# Patient Record
Sex: FEMALE | Race: WHITE | NOT HISPANIC OR LATINO
[De-identification: names, ages, dates, MRNs, and addresses within clinical notes are randomized per-mention and may not be internally consistent; named-entity substitution may affect disease eponyms.]

---

## 2022-06-28 PROBLEM — Z00.00 ENCOUNTER FOR PREVENTIVE HEALTH EXAMINATION: Status: ACTIVE | Noted: 2022-06-28

## 2022-06-30 ENCOUNTER — APPOINTMENT (OUTPATIENT)
Dept: ORTHOPEDIC SURGERY | Facility: CLINIC | Age: 62
End: 2022-06-30

## 2022-06-30 PROCEDURE — 99204 OFFICE O/P NEW MOD 45 MIN: CPT

## 2022-06-30 PROCEDURE — 73560 X-RAY EXAM OF KNEE 1 OR 2: CPT | Mod: RT

## 2022-07-05 DIAGNOSIS — Z87.39 PERSONAL HISTORY OF OTHER DISEASES OF THE MUSCULOSKELETAL SYSTEM AND CONNECTIVE TISSUE: ICD-10-CM

## 2022-07-05 DIAGNOSIS — Z86.79 PERSONAL HISTORY OF OTHER DISEASES OF THE CIRCULATORY SYSTEM: ICD-10-CM

## 2022-07-05 DIAGNOSIS — Z78.9 OTHER SPECIFIED HEALTH STATUS: ICD-10-CM

## 2022-07-06 NOTE — ADDENDUM
[FreeTextEntry1] : This note was written by Daja Lopez on 07/06/2022 acting as a scribe for KYLE SANDY III, MD

## 2022-07-06 NOTE — HISTORY OF PRESENT ILLNESS
[de-identified] : The patient comes in today with complaints to her right knee.  She has been seen and treated elsewhere with physical therapy and cortisone injections, but is having persistent complaints of pain. She states it has been going on for six years. This injury is not work related or due to an automobile accident.  The patient states the pain is constant.  The patient describes the pain as achy and cramping.  The patient notes ice makes her symptoms better, while walking and bending makes her symptoms worse.  The patient indicates a pain level of 6 on a pain scale of 0-10.  [] : No [3] : the relief from medicine is 3/10 [9] : the ailment interference is 9/10 [7] : the ailment interference is 7/10 [8] : the ailment interference is 8/10 [de-identified] : Meloxicam

## 2022-07-06 NOTE — DISCUSSION/SUMMARY
[de-identified] : At this time, due to osteoarthritis of the right knee, we had a long discussion and since she has failed her other conservative modalities, I recommend she undergo a course of viscosupplementation.

## 2022-07-06 NOTE — PHYSICAL EXAM
[de-identified] : Left Knee: \par Range of Motion in Degrees	\par 	                  Claimant:	Normal:	\par Flexion Active	  135 	                135-degrees	\par Flexion Passive	  135	                135-degrees	\par Extension Active	  0-5	                0-5-degrees	\par Extension Passive	  0-5	                0-5-degrees	\par \par No weakness to flexion/extension.  No evidence of instability in the AP plane or varus or valgus stress.  Negative  Lachman.  Negative pivot shift.  Negative anterior drawer test.  Negative posterior drawer test.  Negative Chadd.  Negative Apley grind.  No medial or lateral joint line tenderness.  No tenderness over the medial and lateral facet of the patella.  No patellofemoral crepitations.  No lateral tilting patella.  No patellar apprehension.  No crepitation in the medial and lateral femoral condyle.  No proximal or distal swelling, edema or tenderness.  No gross motor or sensory deficits.  No intra-articular swelling.  2+ DP and PT pulses. No varus or valgus malalignment.  Skin is intact.  No rashes, scars or lesions.  \par \par Right Knee: \par Range of Motion in Degrees	\par 	                  Claimant:	Normal:	\par Flexion Active	  120 	                135-degrees	\par Flexion Passive	  120	                135-degrees	\par Extension Active	   10	                0-5-degrees	\par Extension Passive	   10	                0-5-degrees	\par \par No weakness to flexion/extension.  No evidence of instability in the AP plane or varus or valgus stress.  Negative  Lachman.  Negative pivot shift.  Negative anterior drawer test.  Negative posterior drawer test.  Negative Chadd.  Negative Apley grind.  No medial or lateral joint line tenderness.  Positive tenderness over the medial and lateral facet of the patella.  Positive patellofemoral crepitations.  No lateral tilting patella.  No patella apprehension.  Positive crepitation in the medial and lateral femoral condyle.  No proximal or distal swelling, edema or tenderness.  No gross motor or sensory deficits.  Mild intra-articular swelling.  2+ DP and PT pulses.  Mild varus deformity.  Skin is intact.  No rashes, scars or lesions.   \par   [de-identified] : Ambulating with a slightly antalgic to antalgic gait.  Station:  Normal.  [de-identified] : Appearance:  Well-developed, well-nourished female in no acute distress.\par   [de-identified] : Radiographs, which were taken in the office, two views of the right knee, show moderate to severe arthritis.

## 2022-07-06 NOTE — CONSULT LETTER
[Dear  ___] : Dear  [unfilled], [FreeTextEntry1] : \par I had the pleasure of evaluating your patient, Flory Davalos.\par \par Thank you very much for allowing me to participate in the care of this patient. \par Please see my note below.\par \par If you have any questions, please do not hesitate to contact me.\par \par Sincerely,\par \par \par Morales Maki III, MD\par ERIS/sg

## 2022-07-07 ENCOUNTER — APPOINTMENT (OUTPATIENT)
Dept: ORTHOPEDIC SURGERY | Facility: CLINIC | Age: 62
End: 2022-07-07

## 2022-07-07 PROCEDURE — 20610 DRAIN/INJ JOINT/BURSA W/O US: CPT | Mod: RT

## 2022-07-07 PROCEDURE — 99214 OFFICE O/P EST MOD 30 MIN: CPT | Mod: 25

## 2022-07-07 RX ORDER — HYALURONATE SODIUM 30 MG/2 ML
30 SYRINGE (ML) INTRAARTICULAR
Qty: 4 | Refills: 0 | Status: ACTIVE | OUTPATIENT
Start: 2022-07-07

## 2022-07-13 NOTE — ADDENDUM
[FreeTextEntry1] : This note was written by Cara Ingram on 07/12/2022 acting as scribe for Morales Maki III, MD

## 2022-07-13 NOTE — DISCUSSION/SUMMARY
[de-identified] : At this time, due to osteoarthritis of the right knee and because of the delay in getting approval for viscosupplementation, the patient was given a cortisone injection (as noted above).  I recommend ice and elevation.   As far as the left knee osteoarthritis, I recommend home physical therapy. She will be reassessed in two to three weeks.\par \par

## 2022-07-13 NOTE — REASON FOR VISIT
[Follow-Up Visit] : a follow-up visit for [FreeTextEntry2] : her right knee and new concern for her left knee.

## 2022-07-13 NOTE — PHYSICAL EXAM
[de-identified] : Right Knee: \par Range of Motion in Degrees	\par 	  Claimant:	Normal:	\par Flexion Active	 120 	 135-degrees	\par Flexion Passive	 120	 135-degrees	\par Extension Active	 10	 0-5-degrees	\par Extension Passive	 10	 0-5-degrees	\par \par No weakness to flexion/extension. No evidence of instability in the AP plane or varus or valgus stress. Negative Lachman. Negative pivot shift. Negative anterior drawer test. Negative posterior drawer test. Negative Chadd. Negative Apley grind. No medial or lateral joint line tenderness. Positive tenderness over the medial and lateral facet of the patella. Positive patellofemoral crepitations. No lateral tilting patella. No patella apprehension. Positive crepitation in the medial and lateral femoral condyle. No proximal or distal swelling, edema or tenderness. No gross motor or sensory deficits. Mild intra-articular swelling. 2+ DP and PT pulses. Mild varus deformity. Skin is intact. No rashes, scars or lesions. \par \par Left Knee:\par Range of Motion in Degrees	\par 	                  Claimant:	Normal:	\par Flexion Active	  135 	                135-degrees	\par Flexion Passive	  135	                135-degrees	\par Extension Active	  0-5	                0-5-degrees	\par Extension Passive	  0-5	                0-5-degrees	\par \par No weakness to flexion/extension.  No evidence of instability in the AP plane or varus or valgus stress.  Negative  Lachman.  Negative pivot shift.  Negative anterior drawer test.  Negative posterior drawer test.  Negative Chadd.  Negative Apley grind.  No medial or lateral joint line tenderness.  Positive tenderness over the medial and lateral facet of the patella.  Positive patellofemoral crepitations.  No lateral tilting patella.  No patella apprehension.  Positive crepitation in the medial and lateral femoral condyle.  No proximal or distal swelling, edema or tenderness.  No gross motor or sensory deficits.  Mild intra-articular swelling.  2+ DP and PT pulses.  No varus or valgus malalignment.  Skin is intact.  No rashes, scars or lesions. \par  [de-identified] : Gait:  Ambulating with a slightly antalgic to antalgic gait.  Station:  Normal.  [de-identified] : Appearance:  Well-developed, well-nourished female in no acute distress.\par   [de-identified] : Radiographs, one to two views of the left knee taken in the office today, shows severe osteoarthritis.

## 2022-07-13 NOTE — PROCEDURE
[de-identified] : Indication:  Osteoarthritis of Right Knee\par \par Consent: \par At this time, I have recommended an injection to the right knee.  The risks and benefits of the procedure were discussed with the patient in detail.  Upon verbal consent of the patient, we proceeded with the injection as noted below.  \par \par Procedure:  \par After a sterile prep, the patient underwent an injection of 9 cc of 1% Lidocaine without epinephrine and 1 cc of Kenalog (40mg/mL) into the right knee.  The patient tolerated the procedure well.  There were no complications.  \par \par : [employer]par Drug name:     Triamcinolone Acetonide Injectable Suspension USP\par NDC #:            3265-8511-80\par Lot #:              [id number]\par Expiration:      09/23

## 2022-11-08 ENCOUNTER — APPOINTMENT (OUTPATIENT)
Dept: ORTHOPEDIC SURGERY | Facility: CLINIC | Age: 62
End: 2022-11-08

## 2022-11-08 VITALS
SYSTOLIC BLOOD PRESSURE: 149 MMHG | WEIGHT: 150 LBS | HEART RATE: 80 BPM | BODY MASS INDEX: 27.6 KG/M2 | HEIGHT: 62 IN | DIASTOLIC BLOOD PRESSURE: 85 MMHG

## 2022-11-08 PROCEDURE — 20610 DRAIN/INJ JOINT/BURSA W/O US: CPT | Mod: RT

## 2022-11-10 NOTE — DISCUSSION/SUMMARY
[de-identified] : The patient was given a cortisone injection, as noted above, for the osteoarthritis, right knee.  At this time I recommend ice and elevation.  She is advised to return back to the office.  A discussion of a total knee replacement was had with the patient.  She was advised she cannot get it for at least three months because she did get the cortisone injection.  Also, a discussion of gel injections was had with the patient, but her insurance does not cover it.  She will return back to the office as needed.

## 2022-11-10 NOTE — PROCEDURE
[de-identified] : Consent: \par At this time, I have recommended an injection to the right knee.  The risks and benefits of the procedure were discussed with the patient in detail.  Upon verbal consent of the patient, we proceeded with the injection as noted below.  \par \par Indications: Osteoarthritis, right knee\par : [employer]par Drug name: Triamcinolone Acetonide Injectable Suspension USP\par NDC#: 0143-9577-10\par Lot#: [id number]\par Expiration date: 01/24\par \par Procedure:\par After a sterile prep, the patient underwent an injection of 9 cc of 1% Lidocaine (10mg/mL) without epinephrine and 1 cc of Kenalog (40 mg/mL) into the right knee.  The patient tolerated the procedure well.  There were no complications.

## 2022-11-10 NOTE — PHYSICAL EXAM
[de-identified] : Right knee:\par Knee: Range of Motion in Degrees	\par 	                  Claimant:	Normal:	\par Flexion Active	  135 	                135-degrees	\par Flexion Passive	  135	                135-degrees	\par Extension Active	  0-5	                0-5-degrees	\par Extension Passive	  0-5	                0-5-degrees	\par \par No weakness to flexion/extension. No evidence of instability in the AP plane or varus or valgus stress.  Negative  Lachman.  Negative pivot shift.  Negative anterior drawer test.  Negative posterior drawer test.  Negative Chadd.  Negative Apley grind.  No medial or lateral joint line tenderness.  Positive tenderness over the medial and lateral facet of the patella.  Positive patellofemoral crepitations.  No lateral tilting patella.  No patella apprehension.  Positive crepitation in the medial and lateral femoral condyle.  No proximal or distal swelling, edema or tenderness.  No gross motor or sensory deficits. Mild intra-articular swelling.  2+ DP and PT pulses. No varus or valgus malalignment.  Skin is intact.  No rashes, scars or lesions.  [de-identified] : Gait: Slightly antalgic to antalgic gait.  Station: Normal  [de-identified] : Appearance: Well-developed, well-nourished female  in no acute distress.  [de-identified] : X-rays back from July reveal severe osteoarthritis  of the right knee, bone on bone in the medial compartment.

## 2022-11-10 NOTE — ADDENDUM
[FreeTextEntry1] : This note was written by Cookie Cyr on 11/10/2022 acting as scribe for Dora Avilez, DEMETRIOR/EVANGELINA, PA.\par

## 2023-05-10 ENCOUNTER — APPOINTMENT (OUTPATIENT)
Dept: ORTHOPEDIC SURGERY | Facility: CLINIC | Age: 63
End: 2023-05-10
Payer: MEDICAID

## 2023-05-10 VITALS
HEIGHT: 62 IN | SYSTOLIC BLOOD PRESSURE: 132 MMHG | WEIGHT: 150 LBS | BODY MASS INDEX: 27.6 KG/M2 | DIASTOLIC BLOOD PRESSURE: 85 MMHG | HEART RATE: 93 BPM

## 2023-05-10 PROCEDURE — 73560 X-RAY EXAM OF KNEE 1 OR 2: CPT | Mod: RT

## 2023-05-10 PROCEDURE — 20610 DRAIN/INJ JOINT/BURSA W/O US: CPT | Mod: RT

## 2023-05-15 NOTE — ADDENDUM
[FreeTextEntry1] : This note was written by Elio Huerta on 05/15/2023, acting as a scribe for Morales Maki III, MD

## 2023-05-15 NOTE — DISCUSSION/SUMMARY
[de-identified] : At this time, I recommended ice and elevation for the right knee osteoarthritis.  She will be reassessed in 3-4 weeks.\par

## 2023-08-22 ENCOUNTER — APPOINTMENT (OUTPATIENT)
Dept: ORTHOPEDIC SURGERY | Facility: CLINIC | Age: 63
End: 2023-08-22
Payer: MEDICAID

## 2023-08-22 VITALS
HEART RATE: 82 BPM | SYSTOLIC BLOOD PRESSURE: 128 MMHG | BODY MASS INDEX: 27.6 KG/M2 | WEIGHT: 150 LBS | DIASTOLIC BLOOD PRESSURE: 81 MMHG | HEIGHT: 62 IN

## 2023-08-22 PROCEDURE — 20610 DRAIN/INJ JOINT/BURSA W/O US: CPT | Mod: RT

## 2023-08-23 NOTE — PROCEDURE
[de-identified] :    Indication: Osteoarthritis of right knee   Consent: At this time, I have recommended an injection to the right knee.  The risks and benefits of the procedure were discussed with the patient in detail.  Upon verbal consent of the patient, we proceeded with the injection as noted below.    Description of Procedure:  After a sterile prep, the patient underwent an injection of approximately 9 mL of 1% Lidocaine 10 mg/mL without epinephrine and 1 mL of Kenalog (40 mg/mL) into the right knee.   The patient tolerated the procedure well.  There were no complications.   : [employer] Drug Name: Triamcinolone Acetonide Injectable Suspension USP NDC#: 0143-9577-10 Lot#:   [id number] Expiration Date: 01/2024

## 2023-08-23 NOTE — PHYSICAL EXAM
[de-identified] : Right Knee: Range of Motion in Degrees	 	                  Claimant:	Normal:	 Flexion Active	          135 	      135-degrees	 Flexion Passive	  135	       135-degrees	 Extension Active	  0-5	                0-5-degrees	 Extension Passive	  0-5	                0-5-degrees	  No weakness to flexion/extension. No evidence of instability in the AP plane or varus or valgus stress.  Negative Lachman.  Negative pivot shift.  Negative anterior drawer test.  Negative posterior drawer test.  Negative Chadd.  Negative Apley grind.  No medial or lateral joint line tenderness.  Positive tenderness over the medial and lateral facet of the patella.  Positive patellofemoral crepitations.  No lateral tilting patella.  No patella apprehension.  Positive crepitation in the medial and lateral femoral condyle.  No proximal or distal swelling, edema or tenderness.  No gross motor or sensory deficits. Mild intra-articular swelling.  2+ DP and PT pulses. No varus or valgus malalignment.  Skin is intact.  No rashes, scars or lesions.   [de-identified] : Gait and Station:  Ambulating with a slightly antalgic to antalgic gait.  Normal Station.  [de-identified] : Appearance:  Well-developed, well-nourished female in no acute distress.

## 2023-08-23 NOTE — DISCUSSION/SUMMARY
[de-identified] : At this time, I recommended ice and elevation for the right knee osteoarthritis.  The patient was advised to return back to the office as needed.

## 2023-08-23 NOTE — DISCUSSION/SUMMARY
[de-identified] : At this time, I recommended ice and elevation for the right knee osteoarthritis.  The patient was advised to return back to the office as needed.

## 2023-08-23 NOTE — PHYSICAL EXAM
[de-identified] : Right Knee: Range of Motion in Degrees	 	                  Claimant:	Normal:	 Flexion Active	          135 	      135-degrees	 Flexion Passive	  135	       135-degrees	 Extension Active	  0-5	                0-5-degrees	 Extension Passive	  0-5	                0-5-degrees	  No weakness to flexion/extension. No evidence of instability in the AP plane or varus or valgus stress.  Negative Lachman.  Negative pivot shift.  Negative anterior drawer test.  Negative posterior drawer test.  Negative Chadd.  Negative Apley grind.  No medial or lateral joint line tenderness.  Positive tenderness over the medial and lateral facet of the patella.  Positive patellofemoral crepitations.  No lateral tilting patella.  No patella apprehension.  Positive crepitation in the medial and lateral femoral condyle.  No proximal or distal swelling, edema or tenderness.  No gross motor or sensory deficits. Mild intra-articular swelling.  2+ DP and PT pulses. No varus or valgus malalignment.  Skin is intact.  No rashes, scars or lesions.   [de-identified] : Gait and Station:  Ambulating with a slightly antalgic to antalgic gait.  Normal Station.  [de-identified] : Appearance:  Well-developed, well-nourished female in no acute distress.

## 2023-08-23 NOTE — PROCEDURE
[de-identified] :    Indication: Osteoarthritis of right knee   Consent: At this time, I have recommended an injection to the right knee.  The risks and benefits of the procedure were discussed with the patient in detail.  Upon verbal consent of the patient, we proceeded with the injection as noted below.    Description of Procedure:  After a sterile prep, the patient underwent an injection of approximately 9 mL of 1% Lidocaine 10 mg/mL without epinephrine and 1 mL of Kenalog (40 mg/mL) into the right knee.   The patient tolerated the procedure well.  There were no complications.   : [employer] Drug Name: Triamcinolone Acetonide Injectable Suspension USP NDC#: 0143-9577-10 Lot#:   [id number] Expiration Date: 01/2024

## 2023-11-07 ENCOUNTER — APPOINTMENT (OUTPATIENT)
Dept: ORTHOPEDIC SURGERY | Facility: CLINIC | Age: 63
End: 2023-11-07
Payer: MEDICAID

## 2023-11-07 VITALS
BODY MASS INDEX: 27.6 KG/M2 | HEART RATE: 73 BPM | HEIGHT: 62 IN | DIASTOLIC BLOOD PRESSURE: 77 MMHG | TEMPERATURE: 98 F | SYSTOLIC BLOOD PRESSURE: 144 MMHG | WEIGHT: 150 LBS

## 2023-11-07 PROCEDURE — 20610 DRAIN/INJ JOINT/BURSA W/O US: CPT | Mod: 50

## 2023-11-07 PROCEDURE — 73560 X-RAY EXAM OF KNEE 1 OR 2: CPT | Mod: 50

## 2024-01-11 ENCOUNTER — LABORATORY RESULT (OUTPATIENT)
Age: 64
End: 2024-01-11

## 2024-01-11 ENCOUNTER — APPOINTMENT (OUTPATIENT)
Dept: SURGERY | Facility: CLINIC | Age: 64
End: 2024-01-11
Payer: COMMERCIAL

## 2024-01-11 VITALS
SYSTOLIC BLOOD PRESSURE: 150 MMHG | WEIGHT: 150 LBS | RESPIRATION RATE: 16 BRPM | BODY MASS INDEX: 27.6 KG/M2 | HEIGHT: 62 IN | HEART RATE: 77 BPM | DIASTOLIC BLOOD PRESSURE: 104 MMHG

## 2024-01-11 PROCEDURE — 11402 EXC TR-EXT B9+MARG 1.1-2 CM: CPT

## 2024-01-12 ENCOUNTER — APPOINTMENT (OUTPATIENT)
Dept: SURGERY | Facility: CLINIC | Age: 64
End: 2024-01-12
Payer: COMMERCIAL

## 2024-01-12 DIAGNOSIS — L72.11 PILAR CYST: ICD-10-CM

## 2024-01-12 PROCEDURE — 99024 POSTOP FOLLOW-UP VISIT: CPT

## 2024-01-17 ENCOUNTER — NON-APPOINTMENT (OUTPATIENT)
Age: 64
End: 2024-01-17

## 2024-03-12 ENCOUNTER — APPOINTMENT (OUTPATIENT)
Dept: ORTHOPEDIC SURGERY | Facility: CLINIC | Age: 64
End: 2024-03-12
Payer: COMMERCIAL

## 2024-03-12 VITALS — HEIGHT: 62 IN | WEIGHT: 150 LBS | BODY MASS INDEX: 27.6 KG/M2

## 2024-03-12 PROCEDURE — 73560 X-RAY EXAM OF KNEE 1 OR 2: CPT | Mod: RT

## 2024-03-12 PROCEDURE — 20610 DRAIN/INJ JOINT/BURSA W/O US: CPT | Mod: RT

## 2024-03-12 PROCEDURE — 99213 OFFICE O/P EST LOW 20 MIN: CPT | Mod: 25

## 2024-03-14 NOTE — HISTORY OF PRESENT ILLNESS
[de-identified] : The patient comes in today with complaints of pain to her bilateral knees.  She states she [place] in the supermarket on a wet floor and she went down on both knees.

## 2024-03-14 NOTE — DISCUSSION/SUMMARY
[de-identified] : The patient presents with osteoarthritis of the bilateral knees.  The patient was given a cortisone injection to the right knee today.  She will come back for the left knee injection next week.  She does have a little pes bursitis of the right knee as well, which will be evaluated next week as well.

## 2024-03-14 NOTE — PROCEDURE
[de-identified] : Indication: Osteoarthritis right knee   Consent: At this time, I have recommended an injection to the right knee.  The risks and benefits of the procedure were discussed with the patient in detail.  Upon verbal consent of the patient, we proceeded with the injection as noted below.   Description of Procedure: After a sterile prep, the patient underwent an injection of approximately 9 mL of 1% Lidocaine (10 mg/mL) without epinephrine and 1 mL of triamcinolone acetonide (40 mg/mL) into the right knee.  The patient tolerated the procedure well.  There were no complications.   :  [employer] Drug Name:  Triamcinolone Acetonide Injectable Suspension USP NCD#:  [address]-1 Lot#:  [id number] Expiration Date:  08/31/2025

## 2024-03-14 NOTE — PHYSICAL EXAM
[de-identified] : Right Knee: Knee: Range of Motion in Degrees	 	                  Claimant:	Normal:	 Flexion Active	  135 	                135-degrees	 Flexion Passive	  135	                135-degrees	 Extension Active	  0-5	                0-5-degrees	 Extension Passive	  0-5	                0-5-degrees	  She does have a cut on her right knee, which is healing.  No weakness to flexion/extension.  Tenderness over the pes bursa.  No evidence of instability in the AP plane or varus or valgus stress.  Negative  Lachman.  Negative pivot shift.  Negative anterior drawer test.  Negative posterior drawer test.  Negative Chadd.  Negative Apley grind.  No medial or lateral joint line tenderness.  Positive tenderness over the medial and lateral facet of the patella.  Positive patellofemoral crepitations.  No lateral tilting patella.  No patella apprehension.  Positive crepitation in the medial and lateral femoral condyle.  No proximal or distal tenderness.  No gross motor or sensory deficits.  Mild intra-articular swelling.  Extra-articular swelling over the proximal medial aspect of the tibia.  2+ DP and PT pulses.  No varus or valgus malalignment.    Left Knee: Knee: Range of Motion in Degrees	 	                  Claimant:	Normal:	 Flexion Active	  135 	                135-degrees	 Flexion Passive	  135	                135-degrees	 Extension Active	  0-5	                0-5-degrees	 Extension Passive	  0-5	                0-5-degrees	  No weakness to flexion/extension.  No evidence of instability in the AP plane or varus or valgus stress.  Negative  Lachman.  Negative pivot shift.  Negative anterior drawer test.  Negative posterior drawer test.  Negative Chadd.  Negative Apley grind.  No medial or lateral joint line tenderness.  Positive tenderness over the medial and lateral facet of the patella.  Positive patellofemoral crepitations.  No lateral tilting patella.  No patella apprehension.  Positive crepitation in the medial and lateral femoral condyle.  No proximal or distal swelling, edema or tenderness.  No gross motor or sensory deficits.  Mild intra-articular swelling.  2+ DP and PT pulses.  No varus or valgus malalignment.  Skin is intact.  No rashes, scars or lesions.    [de-identified] : Gait and Station:  Ambulating with a slightly antalgic to antalgic gait.  Station:  Normal.  [de-identified] : Appearance:  Well-developed, well-nourished female in no acute distress.   [de-identified] : Radiographs, one to two views of the bilateral knees with AP standing taken in the office today, show severe arthritis.

## 2024-03-14 NOTE — ADDENDUM
[FreeTextEntry1] : This note was written by Cara Ingram on 03/14/2024 acting as scribe for Dora Avilez, OTR/L, PA

## 2024-03-19 ENCOUNTER — APPOINTMENT (OUTPATIENT)
Dept: ORTHOPEDIC SURGERY | Facility: CLINIC | Age: 64
End: 2024-03-19
Payer: COMMERCIAL

## 2024-03-19 VITALS
BODY MASS INDEX: 27.6 KG/M2 | SYSTOLIC BLOOD PRESSURE: 145 MMHG | HEART RATE: 76 BPM | HEIGHT: 62 IN | DIASTOLIC BLOOD PRESSURE: 84 MMHG | WEIGHT: 150 LBS

## 2024-03-19 DIAGNOSIS — M17.12 UNILATERAL PRIMARY OSTEOARTHRITIS, LEFT KNEE: ICD-10-CM

## 2024-03-19 DIAGNOSIS — M17.0 BILATERAL PRIMARY OSTEOARTHRITIS OF KNEE: ICD-10-CM

## 2024-03-19 PROCEDURE — 20610 DRAIN/INJ JOINT/BURSA W/O US: CPT | Mod: LT

## 2024-03-19 PROCEDURE — 99213 OFFICE O/P EST LOW 20 MIN: CPT | Mod: 25

## 2024-03-20 NOTE — HISTORY OF PRESENT ILLNESS
[de-identified] : The patient comes in today to get her left knee injection.  Last week, she had her right knee injection for pes bursitis.  She states that her right knee is still bothering her consistently after the injection and she has pain in the right knee consistently.  She states she wants to make sure after the fall that she did not have any fracture.  Her x-rays from last week revealed she did not have any fracture or dislocations.

## 2024-03-20 NOTE — PROCEDURE
[de-identified] :   Indication: Osteoarthritis of left knee   Consent: At this time, I have recommended an injection to the left knee.  The risks and benefits of the procedure were discussed with the patient in detail.  Upon verbal consent of the patient, we proceeded with the injection as noted below.   Description of Procedure: After a sterile prep, the patient underwent an injection of approximately 9 mL of 1% Lidocaine (10 mg/mL) without epinephrine and 1 mL of triamcinolone acetonide (40 mg/mL) into the left knee.  The patient tolerated the procedure well.  There were no complications.   : [employer] Drug Name: Triamcinolone Acetonide Injectable Suspension USP NDC#: [address]-0 Lot#:  [id number] Expiration Date: 08/31/25

## 2024-03-20 NOTE — DISCUSSION/SUMMARY
[de-identified] : At this time, I recommended ice and elevation for the left knee osteoarthritis.  We are going to order an MRI to make sure there is no occult fracture in the right knee.

## 2024-03-20 NOTE — ADDENDUM
[FreeTextEntry1] : This note was written by Elio Huerta on 03/20/2024, acting as a scribe for BUCKY PINZON, MONICA/L, PA

## 2024-03-20 NOTE — PHYSICAL EXAM
[de-identified] : Left Knee: Range of Motion in Degrees                     Claimant: Normal:  Flexion Active   135                  135-degrees  Flexion Passive   135                 135-degrees  Extension Active   0-5                 0-5-degrees  Extension Passive   0-5                 0-5-degrees    No weakness to flexion/extension. No evidence of instability in the AP plane or varus or valgus stress.  Negative Lachman.  Negative pivot shift.  Negative anterior drawer test.  Negative posterior drawer test.  Negative Chadd.  Negative Apley grind.  No medial or lateral joint line tenderness.  Positive tenderness over the medial and lateral facet of the patella.  Positive patellofemoral crepitations.  No lateral tilting patella.  No patella apprehension.  Positive crepitation in the medial and lateral femoral condyle.  No proximal or distal swelling, edema or tenderness.  No gross motor or sensory deficits. Mild intra-articular swelling.  2+ DP and PT pulses. No varus or valgus malalignment.  Skin is intact.  No rashes, scars or lesions.   Right Knee: Range of Motion in Degrees                     Claimant: Normal:  Flexion Active   135                  135-degrees  Flexion Passive   135                 135-degrees  Extension Active   0-5                 0-5-degrees  Extension Passive   0-5                 0-5-degrees    No weakness to flexion/extension.   Tenderness over the pes bursa.  No evidence of instability in the AP plane or varus or valgus stress.  Negative Lachman.  Negative pivot shift.  Negative anterior drawer test.  Negative posterior drawer test.  Negative Chadd.  Negative Apley grind.  No medial or lateral joint line tenderness.  Positive tenderness over the medial and lateral facet of the patella.  Positive patellofemoral crepitations.  No lateral tilting patella.  No patella apprehension.  Positive crepitation in the medial and lateral femoral condyle.  No proximal or distal swelling, edema or tenderness.  No gross motor or sensory deficits. Mild intra-articular swelling.  2+ DP and PT pulses. No varus or valgus malalignment.  Skin is intact.  No rashes, scars or lesions.    [de-identified] : Gait and Station:  Ambulating with a slightly antalgic to antalgic gait.  Normal Station.  [de-identified] : Appearance:  Well-developed, well-nourished female in no acute distress.

## 2024-03-26 PROBLEM — M17.12 PRIMARY OSTEOARTHRITIS OF LEFT KNEE: Status: ACTIVE | Noted: 2024-03-19

## 2024-03-26 PROBLEM — M17.0 BILATERAL PRIMARY OSTEOARTHRITIS OF KNEE: Status: ACTIVE | Noted: 2022-07-07

## 2024-05-15 ENCOUNTER — APPOINTMENT (OUTPATIENT)
Dept: ORTHOPEDIC SURGERY | Facility: CLINIC | Age: 64
End: 2024-05-15
Payer: COMMERCIAL

## 2024-05-15 VITALS — WEIGHT: 150 LBS | HEIGHT: 62 IN | BODY MASS INDEX: 27.6 KG/M2

## 2024-05-15 DIAGNOSIS — M71.21 SYNOVIAL CYST OF POPLITEAL SPACE [BAKER], RIGHT KNEE: ICD-10-CM

## 2024-05-15 DIAGNOSIS — M70.51 OTHER BURSITIS OF KNEE, RIGHT KNEE: ICD-10-CM

## 2024-05-15 PROCEDURE — 99213 OFFICE O/P EST LOW 20 MIN: CPT | Mod: 25

## 2024-05-15 PROCEDURE — 20610 DRAIN/INJ JOINT/BURSA W/O US: CPT | Mod: RT

## 2024-05-21 NOTE — ADDENDUM
[FreeTextEntry1] : This note was written by Cookie Cyr on 05/21/2024 acting as scribe for Dora Avilez OTR/EVANGELINA, PA.

## 2024-05-21 NOTE — DISCUSSION/SUMMARY
[de-identified] : The patient presents with OA of the right knee with pes bursitis and a Baker's cyst.  We injected the pes bursa. At this time I recommend ice and elevation.  She is going to get aspiration of the Baker's cyst.  She is advised to put a brace on just in case there is any stress reactions in the knee. She will continue wearing the brace.  She will return back to the office as needed.  She will get the aspiration of the cyst.

## 2024-05-21 NOTE — PROCEDURE
[de-identified] : Consent: At this time, I have recommended an injection to the pes bursa, right knee.  The risks and benefits of the procedure were discussed with the patient in detail.  Upon verbal consent of the patient, we proceeded with the injection as noted below.      Indications:  Osteoarthritis/pes bursitis, right knee : [employer] Drug name: Triamcinolone Acetonide Injectable Suspension USP NDC#: [address]-4 Lot#: [id number] Expiration date: 08/31/25   Procedure: After a sterile prep, the patient underwent an injection of 9 cc of 1% Lidocaine (10mg/mL) without epinephrine and 1 cc of triamcinolone acetonide (40 mg/mL) into the pes bursa, right knee.  The patient tolerated the procedure well.  There were no complications.

## 2024-05-21 NOTE — PHYSICAL EXAM
[de-identified] : Right knee:     Knee: Range of Motion in Degrees                     Claimant: Normal:  Flexion Active          135       135-degrees  Flexion Passive   135        135-degrees  Extension Active   0-5                 0-5-degrees  Extension Passive   0-5                 0-5-degrees     Baker's cyst.  No weakness to flexion/extension. Tenderness over the pes bursa.  No evidence of instability in the AP plane or varus or valgus stress.  Negative  Lachman.  Negative pivot shift.  Negative anterior drawer test.  Negative posterior drawer test.  Negative Chadd.  Negative Apley grind.  No medial or lateral joint line tenderness.  Positive tenderness over the medial and lateral facet of the patella.  Positive patellofemoral crepitations.  No lateral tilting patella.  No patella apprehension.  Positive crepitation in the medial and lateral femoral condyle.  No proximal or distal swelling, edema or tenderness.  No gross motor or sensory deficits. Mild intra-articular swelling. Extra-articular swelling over the proximal medial aspect of the tibia. 2+ DP and PT pulses. No varus or valgus malalignment.   [de-identified] : Gait: Slightly antalgic to antalgic.  Station: Normal  [de-identified] : Appearance: Well-developed, well-nourished female  in no acute distress.

## 2024-05-21 NOTE — HISTORY OF PRESENT ILLNESS
[de-identified] : Flory comes in today with complaints of right knee pain.  She was supposed to get an MRI about two months ago, but she never went because, she states, she got a cortisone and it helped her, but now she has pain again.

## 2024-05-22 ENCOUNTER — APPOINTMENT (OUTPATIENT)
Dept: ORTHOPEDIC SURGERY | Facility: CLINIC | Age: 64
End: 2024-05-22
Payer: COMMERCIAL

## 2024-05-22 ENCOUNTER — TRANSCRIPTION ENCOUNTER (OUTPATIENT)
Age: 64
End: 2024-05-22

## 2024-05-22 VITALS
DIASTOLIC BLOOD PRESSURE: 85 MMHG | BODY MASS INDEX: 27.6 KG/M2 | HEART RATE: 73 BPM | WEIGHT: 150 LBS | SYSTOLIC BLOOD PRESSURE: 138 MMHG | HEIGHT: 62 IN

## 2024-05-22 PROCEDURE — 99213 OFFICE O/P EST LOW 20 MIN: CPT | Mod: 57

## 2024-05-22 PROCEDURE — 27530 TREAT KNEE FRACTURE: CPT | Mod: RT

## 2024-05-22 PROCEDURE — 73560 X-RAY EXAM OF KNEE 1 OR 2: CPT | Mod: RT

## 2024-05-23 NOTE — HISTORY OF PRESENT ILLNESS
[de-identified] : Flory comes in today with persistent complaints to her right knee.  She had a fall in a supermarket.

## 2024-05-23 NOTE — ADDENDUM
[FreeTextEntry1] : This note was written by Cookie Cyr on 05/23/2024 acting as scribe for Morales Maki III, MD

## 2024-05-23 NOTE — DISCUSSION/SUMMARY
[de-identified] : The patient presents with a fracture of the tibial plateau and osteoarthritis, right knee.  At this time I recommend collateral hinged bracing, touch down weightbearing.  She will be reassessed in four weeks.   I declare responsibility and liability for caring for this fracture for the next 90 days.   The patient was prescribed a rigid support Playmaker knee brace with range of motion joints.  The brace will safely protect the patient and help to facilitate healing by stabilizing and controlling the knee.  In order to prevent skin breakdown along bony prominences and to avoid compression of the peroneal nerve, a custom fit is necessary.

## 2024-05-23 NOTE — PHYSICAL EXAM
[Wheelchair] : uses a wheelchair [de-identified] : Right knee: Knee: Range of Motion in Degrees	 	                  Claimant:	Normal:	 Flexion Active	         115 	        135-degrees	 Flexion Passive	 115	                135-degrees	 Extension Active	  0	                0-5-degrees	 Extension Passive	  0	                0-5-degrees	  Mild tenderness with valgus stress.  Significantly point tender over the medial joint line.  No weakness to flexion/extension. No evidence of instability in the AP plane or varus or valgus stress.  Negative  Lachman.  Negative pivot shift.  Negative anterior drawer test.  Negative posterior drawer test.  Negative Chadd.  Negative Apley grind.  Positive tenderness over the medial and lateral facet of the patella.  Positive patellofemoral crepitations.  No lateral tilting patella.  No patella apprehension.  Positive crepitation in the medial and lateral femoral condyle.  No proximal or distal swelling, edema or tenderness.  No gross motor or sensory deficits. Mild intra-articular swelling.  2+ DP and PT pulses. No varus or valgus malalignment.  Skin is intact.  No rashes, scars or lesions.  [de-identified] : Gait: Slightly antalgic to antalgic.  Station: Normal  [de-identified] : Appearance: Well-developed, well-nourished female  in no acute distress.  [de-identified] : Radiographs taken in the office today, one to two views of the right knee, show a coronal fracture line in the medial tibial plateau.

## 2024-05-24 ENCOUNTER — APPOINTMENT (OUTPATIENT)
Dept: ULTRASOUND IMAGING | Facility: CLINIC | Age: 64
End: 2024-05-24

## 2024-06-19 ENCOUNTER — APPOINTMENT (OUTPATIENT)
Dept: ORTHOPEDIC SURGERY | Facility: CLINIC | Age: 64
End: 2024-06-19

## 2024-06-19 VITALS
DIASTOLIC BLOOD PRESSURE: 84 MMHG | HEIGHT: 62 IN | WEIGHT: 150 LBS | HEART RATE: 79 BPM | BODY MASS INDEX: 27.6 KG/M2 | SYSTOLIC BLOOD PRESSURE: 129 MMHG

## 2024-06-19 DIAGNOSIS — M17.11 UNILATERAL PRIMARY OSTEOARTHRITIS, RIGHT KNEE: ICD-10-CM

## 2024-06-19 DIAGNOSIS — S82.141A DISPLACED BICONDYLAR FRACTURE OF RIGHT TIBIA, INITIAL ENCOUNTER FOR CLOSED FRACTURE: ICD-10-CM

## 2024-06-19 PROCEDURE — 73560 X-RAY EXAM OF KNEE 1 OR 2: CPT | Mod: RT

## 2024-06-19 PROCEDURE — 99024 POSTOP FOLLOW-UP VISIT: CPT

## 2024-06-20 PROBLEM — S82.141A CLOSED FRACTURE OF RIGHT TIBIAL PLATEAU, INITIAL ENCOUNTER: Status: ACTIVE | Noted: 2024-05-22

## 2024-06-20 NOTE — DISCUSSION/SUMMARY
[de-identified] : The patient presents with severe arthritis of bilateral knees status post medial-sided tibial plateau fracture in the sagittal plane, right knee.  At this point, we had a long discussion.  She is going to wean off her brace and start on therapy.  I advised her a total knee arthroplasty is also going to be a necessity for her.

## 2024-06-20 NOTE — ADDENDUM
[FreeTextEntry1] : This note was written by Cookie Cyr on 06/20/2024 acting as scribe for Morales Maki III, MD

## 2024-06-20 NOTE — HISTORY OF PRESENT ILLNESS
[de-identified] : Flory comes in today for her right knee.  She states she is still having persistent complaints.

## 2024-06-20 NOTE — PHYSICAL EXAM
[de-identified] : Right knee: Knee: Range of Motion in Degrees	 	                  Claimant:	Normal:	 Flexion Active	         115 	        135-degrees	 Flexion Passive	 115	                135-degrees	 Extension Active	  0	                0-5-degrees	 Extension Passive	  0	                0-5-degrees	  Mild tenderness with valgus stress.  Significantly point tender over the medial joint line.  No weakness to flexion/extension. No evidence of instability in the AP plane or varus or valgus stress.  Negative  Lachman.  Negative pivot shift.  Negative anterior drawer test.  Negative posterior drawer test.  Negative Chadd.  Negative Apley grind.  Positive tenderness over the medial and lateral facet of the patella.  Positive patellofemoral crepitations.  No lateral tilting patella.  No patella apprehension.  Positive crepitation in the medial and lateral femoral condyle.  No proximal or distal swelling, edema or tenderness.  No gross motor or sensory deficits. Mild intra-articular swelling.  2+ DP and PT pulses. No varus or valgus malalignment.  Skin is intact.  No rashes, scars or lesions.  [de-identified] : Radiographs taken in the office today, one to two views of the right knee, show evidence of severe arthritis and healing medial spur.

## 2024-07-17 ENCOUNTER — APPOINTMENT (OUTPATIENT)
Dept: ORTHOPEDIC SURGERY | Facility: CLINIC | Age: 64
End: 2024-07-17

## 2024-07-17 VITALS
WEIGHT: 75 LBS | HEART RATE: 75 BPM | DIASTOLIC BLOOD PRESSURE: 83 MMHG | BODY MASS INDEX: 13.8 KG/M2 | HEIGHT: 62 IN | SYSTOLIC BLOOD PRESSURE: 141 MMHG

## 2024-07-17 DIAGNOSIS — M17.11 UNILATERAL PRIMARY OSTEOARTHRITIS, RIGHT KNEE: ICD-10-CM

## 2024-07-17 DIAGNOSIS — S82.141A DISPLACED BICONDYLAR FRACTURE OF RIGHT TIBIA, INITIAL ENCOUNTER FOR CLOSED FRACTURE: ICD-10-CM

## 2024-07-17 PROCEDURE — 73560 X-RAY EXAM OF KNEE 1 OR 2: CPT | Mod: RT

## 2024-07-17 PROCEDURE — 99024 POSTOP FOLLOW-UP VISIT: CPT

## 2024-07-17 RX ORDER — HYALURONATE SODIUM 10 MG/ML
25 SYRINGE (ML) INTRAARTICULAR
Qty: 5 | Refills: 0 | Status: ACTIVE | OUTPATIENT
Start: 2024-07-17

## 2024-07-18 RX ORDER — HYALURONATE SODIUM 20 MG/2 ML
20 SYRINGE (ML) INTRAARTICULAR
Qty: 3 | Refills: 0 | Status: ACTIVE | OUTPATIENT
Start: 2024-07-18

## 2024-08-15 ENCOUNTER — APPOINTMENT (OUTPATIENT)
Dept: ORTHOPEDIC SURGERY | Facility: CLINIC | Age: 64
End: 2024-08-15

## 2024-08-15 VITALS
HEIGHT: 62 IN | HEART RATE: 91 BPM | WEIGHT: 150 LBS | SYSTOLIC BLOOD PRESSURE: 129 MMHG | BODY MASS INDEX: 27.6 KG/M2 | DIASTOLIC BLOOD PRESSURE: 81 MMHG

## 2024-08-15 DIAGNOSIS — S82.141A DISPLACED BICONDYLAR FRACTURE OF RIGHT TIBIA, INITIAL ENCOUNTER FOR CLOSED FRACTURE: ICD-10-CM

## 2024-08-15 DIAGNOSIS — M17.11 UNILATERAL PRIMARY OSTEOARTHRITIS, RIGHT KNEE: ICD-10-CM

## 2024-08-15 PROCEDURE — 73560 X-RAY EXAM OF KNEE 1 OR 2: CPT | Mod: RT

## 2024-08-15 PROCEDURE — 99024 POSTOP FOLLOW-UP VISIT: CPT

## 2024-08-19 NOTE — ADDENDUM
[FreeTextEntry1] : This note was written by Daja Lopez on 08/19/2024 acting as a scribe for KYLE SANDY III, MD

## 2024-08-19 NOTE — DISCUSSION/SUMMARY
[de-identified] : At this time, due to severe osteoarthritis with a fracture off the medial side of the right knee, which does not appear to be uniting. Again, this is a formal request for viscosupplementation, in the hopes to avoid a total knee arthroplasty.

## 2024-08-19 NOTE — PHYSICAL EXAM
[de-identified] : Right Knee: Range of Motion in Degrees       Claimant:   Normal: Flexion Active    115   135-degrees Flexion Passive   115   135-degrees Extension Active   0   0-5-degrees Extension Passive   0   0-5-degrees  Mild tenderness with valgus stress. Significantly point tender over the medial joint line. No weakness to flexion/extension. No evidence of instability in the AP plane or varus or valgus stress. Negative Lachman. Negative pivot shift. Negative anterior drawer test. Negative posterior drawer test. Negative Chadd. Negative Apley grind. Positive tenderness over the medial and lateral facet of the patella. Positive patellofemoral crepitations. No lateral tilting patella. No patella apprehension. Positive crepitation in the medial and lateral femoral condyle. No proximal or distal swelling, edema or tenderness. No gross motor or sensory deficits. Mild intra-articular swelling. 2+ DP and PT pulses. No varus or valgus malalignment. Skin is intact. No rashes, scars or lesions.    [de-identified] : Radiographs, which were taken in the office today, two views of the right knee, show no interval changes.

## 2024-08-19 NOTE — PHYSICAL EXAM
[de-identified] : Right Knee: Range of Motion in Degrees       Claimant:   Normal: Flexion Active    115   135-degrees Flexion Passive   115   135-degrees Extension Active   0   0-5-degrees Extension Passive   0   0-5-degrees  Mild tenderness with valgus stress. Significantly point tender over the medial joint line. No weakness to flexion/extension. No evidence of instability in the AP plane or varus or valgus stress. Negative Lachman. Negative pivot shift. Negative anterior drawer test. Negative posterior drawer test. Negative Chadd. Negative Apley grind. Positive tenderness over the medial and lateral facet of the patella. Positive patellofemoral crepitations. No lateral tilting patella. No patella apprehension. Positive crepitation in the medial and lateral femoral condyle. No proximal or distal swelling, edema or tenderness. No gross motor or sensory deficits. Mild intra-articular swelling. 2+ DP and PT pulses. No varus or valgus malalignment. Skin is intact. No rashes, scars or lesions.    [de-identified] : Radiographs, which were taken in the office today, two views of the right knee, show no interval changes.

## 2024-08-19 NOTE — DISCUSSION/SUMMARY
[de-identified] : At this time, due to severe osteoarthritis with a fracture off the medial side of the right knee, which does not appear to be uniting. Again, this is a formal request for viscosupplementation, in the hopes to avoid a total knee arthroplasty.